# Patient Record
Sex: FEMALE | Race: WHITE | URBAN - NONMETROPOLITAN AREA
[De-identification: names, ages, dates, MRNs, and addresses within clinical notes are randomized per-mention and may not be internally consistent; named-entity substitution may affect disease eponyms.]

---

## 2021-10-15 ENCOUNTER — OFFICE VISIT (OUTPATIENT)
Dept: NEPHROLOGY | Age: 61
End: 2021-10-15
Payer: MEDICARE

## 2021-10-15 VITALS
OXYGEN SATURATION: 97 % | SYSTOLIC BLOOD PRESSURE: 148 MMHG | TEMPERATURE: 97.3 F | DIASTOLIC BLOOD PRESSURE: 94 MMHG | HEART RATE: 71 BPM | WEIGHT: 151 LBS

## 2021-10-15 DIAGNOSIS — I12.9 HYPERTENSIVE RENAL DISEASE, STAGE 1 THROUGH STAGE 4 OR UNSPECIFIED CHRONIC KIDNEY DISEASE: ICD-10-CM

## 2021-10-15 DIAGNOSIS — N18.4 CKD (CHRONIC KIDNEY DISEASE), STAGE IV (HCC): Primary | ICD-10-CM

## 2021-10-15 PROCEDURE — 99204 OFFICE O/P NEW MOD 45 MIN: CPT | Performed by: INTERNAL MEDICINE

## 2021-10-15 PROCEDURE — 1036F TOBACCO NON-USER: CPT | Performed by: INTERNAL MEDICINE

## 2021-10-15 PROCEDURE — G8484 FLU IMMUNIZE NO ADMIN: HCPCS | Performed by: INTERNAL MEDICINE

## 2021-10-15 PROCEDURE — G8427 DOCREV CUR MEDS BY ELIG CLIN: HCPCS | Performed by: INTERNAL MEDICINE

## 2021-10-15 PROCEDURE — G8421 BMI NOT CALCULATED: HCPCS | Performed by: INTERNAL MEDICINE

## 2021-10-15 PROCEDURE — 3017F COLORECTAL CA SCREEN DOC REV: CPT | Performed by: INTERNAL MEDICINE

## 2021-10-15 RX ORDER — AMLODIPINE BESYLATE 5 MG/1
5 TABLET ORAL DAILY
Qty: 30 TABLET | Refills: 3 | Status: SHIPPED | OUTPATIENT
Start: 2021-10-15 | End: 2022-04-27 | Stop reason: SDUPTHER

## 2021-10-15 RX ORDER — LABETALOL 200 MG/1
200 TABLET, FILM COATED ORAL 2 TIMES DAILY
Qty: 60 TABLET | Refills: 3 | Status: SHIPPED | OUTPATIENT
Start: 2021-10-15 | End: 2022-04-27 | Stop reason: SDUPTHER

## 2021-10-15 RX ORDER — LABETALOL 100 MG/1
100 TABLET, FILM COATED ORAL 2 TIMES DAILY
COMMUNITY
Start: 2021-10-12 | End: 2021-10-15 | Stop reason: DRUGHIGH

## 2021-10-15 NOTE — PROGRESS NOTES
[address] [facility] [address]  Dept: [phone]  Loc: [phone]  Outpatient Consult Note  10/15/2021 11:28 AM        Pt Name:    Carole Hernández  YOB: 1960  Primary Care Physician:  vAa Card     Chief Complaint:   Chief Complaint   Patient presents with   Jael Stephens Patient     Dr Juice Sanders: for renal dysfunction        Background Information/HPI   The patient is a 63 yo female from [place] with no sig [address] who is here for evaluation of elevated creatinine. She is here with daughter-in-law. No hx DM. Has hx HTN but only started getting treated. Did not take medicines for HTN for long time. Daughter-in-law says she would never go to the doctor. She moved from [place] in 2018. She used to work as  in [place]. No hx smoking. No hx alcohol use. No hx CVA. No known heart problems. No hx kidney stones. No leg swelling. No chest pain or shortness of breath. No fever or chills. No hematuria. She just starting seeing a PCP. She has a BP monitor at home. She brought a log of BP readings - mostly running in 140/90 range now. Used to run higher. Daughter in law says she was on BP medications in [place] but then for 3 years she did not take her medicines. She was taking some tylenol. She also took some motrin in the past. No hx cancer or lung problems. Sister has kidney problems in [place]. Allergies:  Patient has no known allergies.         Past Medical History:  Past Medical History:   Diagnosis Date    Hypertension         Past Surgical History:  Past Surgical History:   Procedure Laterality Date     SECTION          Family History:  Family History   Problem Relation Age of Onset    Diabetes Father     Hypertension Father         Social History:  Social History     Socioeconomic History    Marital status: Unknown     Spouse name: Not on file    Number of children: Not on file    Years of education: Not on file    Highest education level: Not on file   Occupational History    Not on file   Tobacco Use    Smoking status: Never Smoker    Smokeless tobacco: Never Used   Vaping Use    Vaping Use: Never used   Substance and Sexual Activity    Alcohol use: Not on file    Drug use: Not on file    Sexual activity: Not on file   Other Topics Concern    Not on file   Social History Narrative    Not on file     Social Determinants of Health     Financial Resource Strain:     Difficulty of Paying Living Expenses:    Food Insecurity:     Worried About Running Out of Food in the Last Year:     [address] in the Last Year:    Transportation Needs:     Lack of Transportation (Medical):      Lack of Transportation (Non-Medical):    Physical Activity:     Days of Exercise per Week:     Minutes of Exercise per Session:    Stress:     Feeling of Stress :    Social Connections:     Frequency of Communication with Friends and Family:     Frequency of Social Gatherings with Friends and Family:     Attends [employer] Services:     Active Member of Clubs or Organizations:     Attends Club or Organization Meetings:     Marital Status:    Intimate Partner Violence:     Fear of Current or Ex-Partner:     Emotionally Abused:     Physically Abused:     Sexually Abused:         Review of Systems:  Constitutional: no fever or chills  Head: No headaches  Eyes: no blurry vision, no discharge  Ears: no ear pain or hearing changes  Nose: no runny nose or epistaxis  Respiratory: no shortness of breath or cough or sputum production  Cardiovascular: no chest pain  GI: no nausea, no vomiting or diarrhea  : denies any discharge  Skin: no rash  Musculoskeletal: no joint pain, moves all ext  Neuro: no numbness or tingling, no slurred speech  Psychiatric: stable mood, no depression or insomnia    All other review of systems were reviewed and negative Home Medications:  Prior to Admission medications    Medication Sig Start Date End Date Taking? Authorizing Provider   labetalol (NORMODYNE) 100 MG tablet Take 100 mg by mouth 2 times daily 10/12/21 11/11/21 Yes Historical Provider, MD        Physical Examination:  VITALS:  BP (!) 148/94 (Site: Right Upper Arm, Position: Sitting, Cuff Size: Medium Adult)   Pulse 71   Temp 97.3 °F (36.3 °C)   Wt 151 lb (68.5 kg)   SpO2 97%   There is no height or weight on file to calculate BMI. Wt Readings from Last 3 Encounters:   10/15/21 151 lb (68.5 kg)     Constitutional and General Appearance: alert and cooperative with exam, appears comfortable, no distress, not diaphoretic  Eyes: no icteric sclera in left eye or right eye  Ears and Nose: normal external appearance of left and right ear  Oral: moist oral mucus membranes  Neck: No jugular venous distention  Lungs: Air entry B/L, no crackles or rales  Chest: No chest wall tenderness  Heart: regular rate, S1, S2  Extremities: No pitting LE edema, no tenderness  GI: soft, non-tender  Skin: no rash seen on exposed extremities, warm to touch  Musculo: moves all extremities  Neuro: no slurred speech  Psychiatric: Normal mood and affect, Not agitated     Laboratory & Diagnostics:  Old progress notes from referring physician reviewed. Radiology/US kidneys:   Echo:   Old labs reviewed:  Jan 2019: Creat 1.3  July 2021: Creat 2.0  Sept 2021: Creat 2.1, K 5.1     Impression/Plan:   1. CKD IV: progressive in nature: likely due to chronically uncontrolled HTN. Likely has HTN nephrosclerosis. Will send work-up including UA urine protein-creat ration and kidney US to evaluate kidney size and echogenicity. Avoid Motrin and other types of NSAIDs. Drink more water. Advised low salt diet. Discussed need for better blood pressure control  2. HTN: Sub-optimally controlled. On labetalol 100 mg BID. Increase labetalol 200 mg BID and start norvasc 5 mg po daily. Advised her low-salt diet. Advised her to reduced red meat intake. Bring blood pressure readings. Call next week with some readings. 3. Recent COVID 23    Discussed with daughter in law. She speaks fairly good english. All questions were answered. Check labs in 2 weeks    Orders Placed This Encounter   Procedures    US RENAL COMPLETE    Basic Metabolic Panel    CBC    PTH, Intact    Phosphorus    Vitamin D 25 Hydroxy    Urinalysis    Protein / creatinine ratio, urine     Return in about 2 months (around 12/15/2021).     Thought process was discussed with the patient  Thank you for the referral  Please do not hesitate to contact me if you have any questions or concerns  I will make further recommendations depending on clinical course and lab/diagnostic results      Zackary Iqbal MD  [facility]

## 2021-10-29 LAB
BASOPHILS ABSOLUTE: ABNORMAL
BASOPHILS RELATIVE PERCENT: ABNORMAL
BILIRUBIN, URINE: NEGATIVE
BLOOD, URINE: POSITIVE
BUN BLDV-MCNC: 30 MG/DL
CALCIUM SERPL-MCNC: 9.6 MG/DL
CHLORIDE BLD-SCNC: 106 MMOL/L
CLARITY: CLEAR
CO2: 27 MMOL/L
COLOR: YELLOW
CREAT SERPL-MCNC: 2.05 MG/DL
EOSINOPHILS ABSOLUTE: ABNORMAL
EOSINOPHILS RELATIVE PERCENT: ABNORMAL
GFR CALCULATED: 25
GLUCOSE BLD-MCNC: 87 MG/DL
GLUCOSE URINE: NEGATIVE
HCT VFR BLD CALC: 34.6 % (ref 36–46)
HEMOGLOBIN: 12.1 G/DL (ref 12–16)
KETONES, URINE: NEGATIVE
LEUKOCYTE ESTERASE, URINE: NEGATIVE
LYMPHOCYTES ABSOLUTE: ABNORMAL
LYMPHOCYTES RELATIVE PERCENT: ABNORMAL
MCH RBC QN AUTO: ABNORMAL PG
MCHC RBC AUTO-ENTMCNC: ABNORMAL G/DL
MCV RBC AUTO: ABNORMAL FL
MONOCYTES ABSOLUTE: ABNORMAL
MONOCYTES RELATIVE PERCENT: ABNORMAL
NEUTROPHILS ABSOLUTE: ABNORMAL
NEUTROPHILS RELATIVE PERCENT: ABNORMAL
NITRITE, URINE: NEGATIVE
PH UA: 5 (ref 4.5–8)
PHOSPHORUS: 4.9 MG/DL
PLATELET # BLD: 201 K/ΜL
PMV BLD AUTO: ABNORMAL FL
POTASSIUM SERPL-SCNC: 4.3 MMOL/L
PROTEIN UA: POSITIVE
RBC # BLD: 3.94 10^6/ΜL
SODIUM BLD-SCNC: 142 MMOL/L
SPECIFIC GRAVITY, URINE: 1.01
UROBILINOGEN, URINE: NORMAL
VITAMIN D 25-HYDROXY: 28.4
VITAMIN D2, 25 HYDROXY: NORMAL
VITAMIN D3,25 HYDROXY: NORMAL
WBC # BLD: 5.7 10^3/ML

## 2021-12-06 DIAGNOSIS — N18.4 CKD (CHRONIC KIDNEY DISEASE), STAGE IV (HCC): ICD-10-CM

## 2021-12-15 ENCOUNTER — OFFICE VISIT (OUTPATIENT)
Dept: NEPHROLOGY | Age: 61
End: 2021-12-15
Payer: MEDICARE

## 2021-12-15 VITALS
SYSTOLIC BLOOD PRESSURE: 125 MMHG | WEIGHT: 157 LBS | TEMPERATURE: 97.2 F | HEART RATE: 73 BPM | DIASTOLIC BLOOD PRESSURE: 75 MMHG | OXYGEN SATURATION: 98 %

## 2021-12-15 DIAGNOSIS — E55.9 VITAMIN D DEFICIENCY: ICD-10-CM

## 2021-12-15 DIAGNOSIS — R80.9 PROTEINURIA, UNSPECIFIED TYPE: ICD-10-CM

## 2021-12-15 DIAGNOSIS — N18.4 CKD (CHRONIC KIDNEY DISEASE), STAGE IV (HCC): Primary | ICD-10-CM

## 2021-12-15 DIAGNOSIS — I12.9 HYPERTENSIVE RENAL DISEASE, STAGE 1 THROUGH STAGE 4 OR UNSPECIFIED CHRONIC KIDNEY DISEASE: ICD-10-CM

## 2021-12-15 PROCEDURE — 1036F TOBACCO NON-USER: CPT | Performed by: INTERNAL MEDICINE

## 2021-12-15 PROCEDURE — G8484 FLU IMMUNIZE NO ADMIN: HCPCS | Performed by: INTERNAL MEDICINE

## 2021-12-15 PROCEDURE — 99214 OFFICE O/P EST MOD 30 MIN: CPT | Performed by: INTERNAL MEDICINE

## 2021-12-15 PROCEDURE — 3017F COLORECTAL CA SCREEN DOC REV: CPT | Performed by: INTERNAL MEDICINE

## 2021-12-15 PROCEDURE — G8427 DOCREV CUR MEDS BY ELIG CLIN: HCPCS | Performed by: INTERNAL MEDICINE

## 2021-12-15 PROCEDURE — G8421 BMI NOT CALCULATED: HCPCS | Performed by: INTERNAL MEDICINE

## 2021-12-15 RX ORDER — LOSARTAN POTASSIUM 50 MG/1
50 TABLET ORAL DAILY
Qty: 90 TABLET | Refills: 3 | Status: SHIPPED | OUTPATIENT
Start: 2021-12-15 | End: 2022-04-27

## 2021-12-15 NOTE — PROGRESS NOTES
[address] [facility] [address]  Dept: [phone]  Loc: [phone]  Office Progress Note  12/15/2021 2:23 PM      Pt Name:    Gisela Adamson  YOB: 1960  Primary Care Physician:  Daniel Sosa     Chief Complaint:   Chief Complaint   Patient presents with    Chronic Kidney Disease     Stage 4        Background Information/Interval History:   The patient is a 65 yo female from [place] with no sig [address] who is here for follow-up evaluation of elevated creatinine. She is here with daughter-in-law. Did not take medicines for HTN for long time. Daughter-in-law says she would never go to the doctor. She moved from [place] in 2018. She used to work as  in [place]. Daughter in law says she was on BP medications in [place] but then for 3 years she did not take her medicines. She also took some motrin in the past.    She is here for follow-up. Here with her daughter-in-law. She has no chest pain or shortness of breath. No urinary complaints. No dysuria. No hematuria. No leg swelling. Brought a log of BP readings and most readings are in 120-130 range. Past History:  Past Medical History:   Diagnosis Date    Hypertension      Past Surgical History:   Procedure Laterality Date     SECTION          VITALS:  /75 (Site: Left Upper Arm, Position: Sitting, Cuff Size: Medium Adult)   Pulse 73   Temp 97.2 °F (36.2 °C)   Wt 157 lb (71.2 kg)   SpO2 98%   Wt Readings from Last 3 Encounters:   12/15/21 157 lb (71.2 kg)   10/15/21 151 lb (68.5 kg)     There is no height or weight on file to calculate BMI.      General Appearance: alert and cooperative with exam, appears comfortable, no distress  Oral: moist oral mucus membranes  Neck: No jugular venous distention  Lungs: Air entry B/L, no crackles or rales, no use of accessory muscles  Heart: S1, S2 heard  GI: soft, non-tender, no guarding  Extremities: No sig LE edema     Medications:  Current Outpatient Medications   Medication Sig Dispense Refill    labetalol (NORMODYNE) 200 MG tablet Take 1 tablet by mouth 2 times daily 60 tablet 3    amLODIPine (NORVASC) 5 MG tablet Take 1 tablet by mouth daily 30 tablet 3     No current facility-administered medications for this visit. Laboratory & Diagnostics:  Old progress notes from referring physician reviewed. Radiology/US kidneys: R 8.7, L 7.6 cm kidneys  Echo:   Old labs reviewed:  Jan 2019: Creat 1.3  July 2021: Creat 2.0  Sept 2021: Creat 2.1, K 5.1  Oct 2021: creat 2.05, K 4.3, UA: 1+ protein, trace blood, Phos 4.9, UPCR 1.3 g/g, Vit D 28, PTH 76     Impression/Plan:   1. CKD IV: progressive in nature: likely due to chronically uncontrolled HTN. Likely has HTN nephrosclerosis. Stable renal funtion but needs good chronic bp control to minimize progression of CKD. UA is otherwise mostly benign but she does have some proteinuria. Advised low salt diet and advised less red meat intake. US noted. Has some B/L small kidneys. She is not certain if she had UTIs in childhood. 2. Proteinuria: needs to monitor. Advised to avoid NSAIDs. Discussed in detail with daughter. Advised low red meat intake. Advised low salt diet. Add cozaar 50 mg po daily. Repeat BMP in 1 month. Repeat urine protein creatinine ratio in 3 months. If persistent then may consider sending some serological evaluation. However she needs better blood pressure control for now  3. HTN: BP starting to improve but still has some room for improvement. She is on labetalol and norvasc. Add Cozaar 50 mg po daily. Recheck labs in 4 weeks. 4. Recent COVID 19  5. Vitamin D deficiency. Will prescribe vitamin D.  Prescription sent    Discussed with patient and daughter in detail. Had long discussion with patient. Patient very concerned about dialysis.   At this time clearly indicated to patient and daughter-in-law that there is no indication for dialysis at the present moment. However strongly emphasized need for better blood pressure control in the long run. Also emphasized need for low-salt diet. Avoid NSAIDs like Aleve, Advil, naproxen, ibuprofen, Motrin etc.    Orders Placed This Encounter   Procedures    Basic Metabolic Panel    CBC    Protein / creatinine ratio, urine    Protein / creatinine ratio, urine    Vitamin D 25 Hydroxy    PTH, Intact    Phosphorus    Basic Metabolic Panel     Return in about 4 months (around 4/15/2022).     Romy Hobson MD  [facility]

## 2022-01-25 ENCOUNTER — TELEPHONE (OUTPATIENT)
Dept: NEPHROLOGY | Age: 62
End: 2022-01-25

## 2022-04-27 ENCOUNTER — OFFICE VISIT (OUTPATIENT)
Dept: NEPHROLOGY | Age: 62
End: 2022-04-27
Payer: COMMERCIAL

## 2022-04-27 VITALS
OXYGEN SATURATION: 94 % | SYSTOLIC BLOOD PRESSURE: 137 MMHG | WEIGHT: 154 LBS | HEART RATE: 79 BPM | TEMPERATURE: 98.2 F | DIASTOLIC BLOOD PRESSURE: 91 MMHG

## 2022-04-27 DIAGNOSIS — N18.4 CKD (CHRONIC KIDNEY DISEASE), STAGE IV (HCC): Primary | ICD-10-CM

## 2022-04-27 DIAGNOSIS — I12.9 HYPERTENSIVE RENAL DISEASE, STAGE 1 THROUGH STAGE 4 OR UNSPECIFIED CHRONIC KIDNEY DISEASE: ICD-10-CM

## 2022-04-27 DIAGNOSIS — R80.9 PROTEINURIA, UNSPECIFIED TYPE: ICD-10-CM

## 2022-04-27 PROBLEM — I10 ESSENTIAL HYPERTENSION: Status: ACTIVE | Noted: 2022-04-22

## 2022-04-27 PROCEDURE — 99213 OFFICE O/P EST LOW 20 MIN: CPT | Performed by: INTERNAL MEDICINE

## 2022-04-27 PROCEDURE — 3017F COLORECTAL CA SCREEN DOC REV: CPT | Performed by: INTERNAL MEDICINE

## 2022-04-27 PROCEDURE — G8427 DOCREV CUR MEDS BY ELIG CLIN: HCPCS | Performed by: INTERNAL MEDICINE

## 2022-04-27 PROCEDURE — G8421 BMI NOT CALCULATED: HCPCS | Performed by: INTERNAL MEDICINE

## 2022-04-27 PROCEDURE — 1036F TOBACCO NON-USER: CPT | Performed by: INTERNAL MEDICINE

## 2022-04-27 RX ORDER — LABETALOL 200 MG/1
200 TABLET, FILM COATED ORAL 2 TIMES DAILY
Qty: 180 TABLET | Refills: 3 | Status: SHIPPED | OUTPATIENT
Start: 2022-04-27

## 2022-04-27 RX ORDER — LOSARTAN POTASSIUM 100 MG/1
100 TABLET ORAL DAILY
Qty: 90 TABLET | Refills: 1 | Status: SHIPPED | OUTPATIENT
Start: 2022-04-27

## 2022-04-27 RX ORDER — AMLODIPINE BESYLATE 10 MG/1
10 TABLET ORAL DAILY
Qty: 90 TABLET | Refills: 3 | Status: SHIPPED | OUTPATIENT
Start: 2022-04-27

## 2022-04-27 NOTE — PROGRESS NOTES
[address] [facility] [address]  Dept: [phone]  Loc: [phone]  Office Progress Note  2022 1:23 PM      Pt Name:    Valentino Ser  YOB: 1960  Primary Care Physician:  Bryan Sparrow     Chief Complaint:   Chief Complaint   Patient presents with    Chronic Kidney Disease     Stage 4        Background Information/Interval History:   The patient is a 65 yo female from [place] with no sig [address] who is here for follow-up evaluation of elevated creatinine. Did not take medicines for HTN for long time. She moved from [place] in 2018. She used to work as  in [place]. Family previously reported that patient was not taking her anti-hypertensive medications for many years while she was still residing at [facility]. She also took some motrin in the past.    She is here for follow-up. Here with daughter, Boom Delgado. BP is much better now. She feels better. No chest pain or shortness of breath. No nausea. No urinary complaints. Family reports that blood pressure has improved     Past History:  Past Medical History:   Diagnosis Date    Hypertension      Past Surgical History:   Procedure Laterality Date     SECTION          VITALS:  BP (!) 137/91 (Site: Right Upper Arm, Position: Sitting, Cuff Size: Medium Adult)   Pulse 79   Temp 98.2 °F (36.8 °C)   Wt 154 lb (69.9 kg)   SpO2 94%   Wt Readings from Last 3 Encounters:   22 154 lb (69.9 kg)   12/15/21 157 lb (71.2 kg)   10/15/21 151 lb (68.5 kg)     There is no height or weight on file to calculate BMI.      General Appearance: alert and cooperative with exam, appears comfortable, no distress  Oral: moist oral mucus membranes  Neck: No jugular venous distention  Lungs: Air entry B/L, no crackles or rales, no use of accessory muscles  Heart: S1, S2 heard  GI: soft, non-tender, no guarding  Extremities: No sig LE edema     Medications:  Current Outpatient Medications   Medication Sig Dispense Refill    losartan (COZAAR) 50 MG tablet Take 1 tablet by mouth daily 90 tablet 3    vitamin D (CHOLECALCIFEROL) 25 MCG (1000 UT) TABS tablet Take 1 tablet by mouth daily 90 tablet 3    labetalol (NORMODYNE) 200 MG tablet Take 1 tablet by mouth 2 times daily 60 tablet 3    amLODIPine (NORVASC) 5 MG tablet Take 1 tablet by mouth daily 30 tablet 3     No current facility-administered medications for this visit. Laboratory & Diagnostics:  Old progress notes from referring physician reviewed. Radiology/US kidneys: R 8.7, L 7.6 cm kidneys  Echo:   Old labs reviewed:  Jan 2019: Creat 1.3  July 2021: Creat 2.0  Sept 2021: Creat 2.1, K 5.1  Oct 2021: creat 2.05, K 4.3, UA: 1+ protein, trace blood, Phos 4.9, UPCR 1.3 g/g, Vit D 28, PTH 76  March 2022: UPCR 1.7 grams/g, Vit D 24, Hb 12.7, Phos 5.0, potassium 5.0, creatinine 2.28     Impression/Plan:   1. CKD IV: progressive in nature: likely due to chronically uncontrolled HTN. Likely has HTN nephrosclerosis. Stable renal funtion but needs good chronic bp control to minimize progression of CKD. UA is otherwise mostly benign but she does have some proteinuria. Advised low salt diet and advised less red meat intake. US noted. Has some B/L small kidneys. She is not certain if she had UTIs in childhood. 2. Proteinuria: has small kidney, ??post adaptive, has nephrosclerosis. Likely contributing to proteinuria. But will send basic serologies including NAYAN, Complements and SPEP and VASYL. Continue with losartan. Increase losartan 100 mg daily. Recheck labs  3. HTN: on labetalol, increase norvasc 10 mg daily and losartan 100 mg daily. Advised to call me with some blood pressure readings next week  4. Recent COVID 19  5. Vitamin D deficiency: on replacement. Refilled    Medications refilled, Discussed with daughter.      Orders Placed This Encounter   Procedures    Basic Metabolic Panel  Protein / creatinine ratio, urine    Electrophoresis Protein, Serum    Immunofixation electrophoresis    NAYAN Screen with Reflex    C4 Complement    C3 Complement     Return in about 4 months (around 8/27/2022).       Go Lopez MD  [facility]

## 2022-08-31 ENCOUNTER — OFFICE VISIT (OUTPATIENT)
Dept: NEPHROLOGY | Age: 62
End: 2022-08-31
Payer: COMMERCIAL

## 2022-08-31 VITALS
WEIGHT: 155 LBS | TEMPERATURE: 98 F | OXYGEN SATURATION: 97 % | DIASTOLIC BLOOD PRESSURE: 81 MMHG | SYSTOLIC BLOOD PRESSURE: 119 MMHG | HEART RATE: 84 BPM

## 2022-08-31 DIAGNOSIS — I12.9 HYPERTENSIVE RENAL DISEASE, STAGE 1 THROUGH STAGE 4 OR UNSPECIFIED CHRONIC KIDNEY DISEASE: ICD-10-CM

## 2022-08-31 DIAGNOSIS — N18.4 CKD (CHRONIC KIDNEY DISEASE), STAGE IV (HCC): Primary | ICD-10-CM

## 2022-08-31 PROCEDURE — G8427 DOCREV CUR MEDS BY ELIG CLIN: HCPCS | Performed by: INTERNAL MEDICINE

## 2022-08-31 PROCEDURE — 99213 OFFICE O/P EST LOW 20 MIN: CPT | Performed by: INTERNAL MEDICINE

## 2022-08-31 PROCEDURE — G8421 BMI NOT CALCULATED: HCPCS | Performed by: INTERNAL MEDICINE

## 2022-08-31 PROCEDURE — 1036F TOBACCO NON-USER: CPT | Performed by: INTERNAL MEDICINE

## 2022-08-31 PROCEDURE — 3017F COLORECTAL CA SCREEN DOC REV: CPT | Performed by: INTERNAL MEDICINE

## 2022-08-31 NOTE — PROGRESS NOTES
[address] [facility] [address]  Dept: [phone]  Loc: [phone]  Office Progress Note  2022 2:18 PM      Pt Name:    Marito Becker  YOB: 1960  Primary Care Physician:  Tanmay Grimaldo     Chief Complaint:   Chief Complaint   Patient presents with    Follow-up     4 months for CKD IV and HTN        Background Information/Interval History:   The patient is a 57 yo female from [place] with no sig [address] who is here for follow-up evaluation of elevated creatinine. Did not take medicines for HTN for long time. She moved from [place] in 2018. She used to work as  in [place]. Family previously reported that patient was not taking her anti-hypertensive medications for many years while she was still residing at [facility]. She also took some motrin in the past.    Pt is here for follow-up. She is here with her daughter. BP at home is usually in 120s per daughter. Today in office it is 119/81. No leg swelling. No chest pain or shortness of breath. She is on oral anti HTN medications. Patient did not get any labs done for this visit. Past History:  Past Medical History:   Diagnosis Date    Hypertension      Past Surgical History:   Procedure Laterality Date     SECTION          VITALS:  /81 (Site: Right Upper Arm, Position: Sitting, Cuff Size: Medium Adult)   Pulse 84   Temp 98 °F (36.7 °C)   Wt 155 lb (70.3 kg)   SpO2 97%   Wt Readings from Last 3 Encounters:   22 155 lb (70.3 kg)   22 154 lb (69.9 kg)   12/15/21 157 lb (71.2 kg)     There is no height or weight on file to calculate BMI.      General Appearance: alert and cooperative with exam, appears comfortable, no distress  Oral: moist oral mucus membranes  Neck: No jugular venous distention  Lungs: Air entry B/L, no crackles or rales, no use of accessory muscles  Heart: S1, S2 heard  GI: soft, non-tender, no guarding  Extremities: No sig LE edema     Medications:  Current Outpatient Medications   Medication Sig Dispense Refill    labetalol (NORMODYNE) 200 MG tablet Take 1 tablet by mouth 2 times daily 180 tablet 3    amLODIPine (NORVASC) 10 MG tablet Take 1 tablet by mouth daily 90 tablet 3    losartan (COZAAR) 100 MG tablet Take 1 tablet by mouth daily 90 tablet 1    vitamin D (CHOLECALCIFEROL) 25 MCG (1000 UT) TABS tablet Take 1 tablet by mouth daily (Patient not taking: Reported on 8/31/2022) 90 tablet 3     No current facility-administered medications for this visit. Laboratory & Diagnostics:  Old progress notes from referring physician reviewed. Radiology/US kidneys: R 8.7, L 7.6 cm kidneys  Echo:   Old labs reviewed:  Jan 2019: Creat 1.3  July 2021: Creat 2.0  Sept 2021: Creat 2.1, K 5.1  Oct 2021: creat 2.05, K 4.3, UA: 1+ protein, trace blood, Phos 4.9, UPCR 1.3 g/g, Vit D 28, PTH 76  March 2022: UPCR 1.7 grams/g, Vit D 24, Hb 12.7, Phos 5.0, potassium 5.0, creatinine 2.28    March 2022: Vit D 24     Impression/Plan:   1. CKD IV: progressive in nature: likely due to chronically uncontrolled HTN. Likely has HTN nephrosclerosis. UA is mostly benign but she does have some proteinuria. Has some B/L small kidneys. She is not certain if she had UTIs in childhood. Pt did nto get any labs done this visit. Will give another set of orders. Discussed with daughter in detail. Need labs ASAP  2. Proteinuria: has small kidney, ??post adaptive, has nephrosclerosis. Likely contributing to proteinuria. But Ordered serologies last time but patient did not get any labs done. Needs basic serologies including NAYAN, Complements and SPEP and VASYL. D/W patient and she will get labs done this week  3. HTN: on labetalol, norvasc,  and losartan 100 mg daily. BP is stable.    4.  Vitamin D deficiency: on replacement    Orders Placed This Encounter   Procedures    CBC    Basic Metabolic Panel    Vitamin D 25 Hydroxy    Protein / creatinine ratio, urine    Urinalysis    Electrophoresis Protein, Serum    Immunofixation electrophoresis    NAYAN Screen with Reflex    C4 Complement    C3 Complement    PTH, Intact    Phosphorus     Return in about 4 months (around 12/31/2022).       Arlen Torres MD  [facility]

## 2022-09-02 NOTE — TELEPHONE ENCOUNTER
----- Message from Edie Fam MD sent at 9/1/2022  9:22 PM EDT -----  Start Ergocalciferol 50,000 units po weekly, Disp #4, Refills # 3      ----- Message -----  From: Jamir Dey  Sent: 9/1/2022  10:16 AM EDT  To: Edie Fam MD

## 2022-09-12 RX ORDER — ERGOCALCIFEROL (VITAMIN D2) 1250 MCG
50000 CAPSULE ORAL WEEKLY
COMMUNITY
End: 2022-09-12 | Stop reason: SDUPTHER

## 2022-09-12 RX ORDER — ERGOCALCIFEROL (VITAMIN D2) 1250 MCG
50000 CAPSULE ORAL WEEKLY
Qty: 4 CAPSULE | Refills: 3 | Status: SHIPPED | OUTPATIENT
Start: 2022-09-12

## 2022-09-12 NOTE — TELEPHONE ENCOUNTER
Spoke to Luis, pt's . He understands that pt is to start taking Ergocalciferol weekly.      Script pending

## 2022-09-19 RX ORDER — ERGOCALCIFEROL 1.25 MG/1
50000 CAPSULE ORAL WEEKLY
Qty: 4 CAPSULE | Refills: 3 | OUTPATIENT
Start: 2022-09-19

## 2023-01-04 ENCOUNTER — OFFICE VISIT (OUTPATIENT)
Dept: NEPHROLOGY | Age: 63
End: 2023-01-04
Payer: COMMERCIAL

## 2023-01-04 VITALS
TEMPERATURE: 98.4 F | WEIGHT: 157 LBS | DIASTOLIC BLOOD PRESSURE: 82 MMHG | OXYGEN SATURATION: 97 % | SYSTOLIC BLOOD PRESSURE: 132 MMHG | HEART RATE: 70 BPM

## 2023-01-04 DIAGNOSIS — R80.9 PROTEINURIA, UNSPECIFIED TYPE: ICD-10-CM

## 2023-01-04 DIAGNOSIS — I12.9 HYPERTENSIVE RENAL DISEASE, STAGE 1 THROUGH STAGE 4 OR UNSPECIFIED CHRONIC KIDNEY DISEASE: ICD-10-CM

## 2023-01-04 DIAGNOSIS — N18.4 CKD (CHRONIC KIDNEY DISEASE), STAGE IV (HCC): Primary | ICD-10-CM

## 2023-01-04 PROCEDURE — 1036F TOBACCO NON-USER: CPT | Performed by: INTERNAL MEDICINE

## 2023-01-04 PROCEDURE — 99213 OFFICE O/P EST LOW 20 MIN: CPT | Performed by: INTERNAL MEDICINE

## 2023-01-04 PROCEDURE — 3017F COLORECTAL CA SCREEN DOC REV: CPT | Performed by: INTERNAL MEDICINE

## 2023-01-04 PROCEDURE — 3079F DIAST BP 80-89 MM HG: CPT | Performed by: INTERNAL MEDICINE

## 2023-01-04 PROCEDURE — G8427 DOCREV CUR MEDS BY ELIG CLIN: HCPCS | Performed by: INTERNAL MEDICINE

## 2023-01-04 PROCEDURE — G8484 FLU IMMUNIZE NO ADMIN: HCPCS | Performed by: INTERNAL MEDICINE

## 2023-01-04 PROCEDURE — 3075F SYST BP GE 130 - 139MM HG: CPT | Performed by: INTERNAL MEDICINE

## 2023-01-04 PROCEDURE — G8421 BMI NOT CALCULATED: HCPCS | Performed by: INTERNAL MEDICINE

## 2023-01-04 RX ORDER — LOSARTAN POTASSIUM 100 MG/1
100 TABLET ORAL DAILY
Qty: 90 TABLET | Refills: 3 | Status: SHIPPED | OUTPATIENT
Start: 2023-01-04

## 2023-01-04 RX ORDER — LABETALOL 200 MG/1
200 TABLET, FILM COATED ORAL 2 TIMES DAILY
Qty: 180 TABLET | Refills: 3 | Status: SHIPPED | OUTPATIENT
Start: 2023-01-04

## 2023-01-04 RX ORDER — AMLODIPINE BESYLATE 10 MG/1
10 TABLET ORAL DAILY
Qty: 90 TABLET | Refills: 3 | Status: SHIPPED | OUTPATIENT
Start: 2023-01-04

## 2023-01-04 NOTE — PROGRESS NOTES
[address] [facility] [address]  Dept: [phone]  Loc: [phone]  Office Progress Note  2023 1:41 PM      Pt Name:    Karin Blunt  YOB: 1960  Primary Care Physician:  Katie Schwartz     Chief Complaint:   Chief Complaint   Patient presents with    Follow-up     4 months for CKD IV and HTN        Background Information/Interval History:   The patient is a 57 yo female from [place] with no sig [address] who is here for follow-up evaluation of elevated creatinine. Did not take medicines for HTN for long time. She moved from [place] in 2018. She used to work as  in [place]. Family previously reported that patient was not taking her anti-hypertensive medications for many years while she was still residing at [facility]. She also took some motrin in the past.    Here for 4 months follow-up. Here with her daughter. Did not get labs drawn. Daughter, Mirtha Richard, reports she is doing okay. No chest pain or shortness of breath. No urinary complaints. BP is better controlled based on several home readings. Patient brought a log. Past History:  Past Medical History:   Diagnosis Date    Hypertension      Past Surgical History:   Procedure Laterality Date     SECTION          VITALS:  /82 (Site: Right Upper Arm, Position: Sitting, Cuff Size: Medium Adult)   Pulse 70   Temp 98.4 °F (36.9 °C)   Wt 157 lb (71.2 kg)   SpO2 97%   Wt Readings from Last 3 Encounters:   23 157 lb (71.2 kg)   22 155 lb (70.3 kg)   22 154 lb (69.9 kg)     There is no height or weight on file to calculate BMI.      General Appearance: alert and cooperative with exam, appears comfortable, no distress  Oral: moist oral mucus membranes  Neck: No jugular venous distention  Lungs: Air entry B/L, no crackles or rales, no use of accessory muscles  Heart: S1, S2 heard  GI: soft, non-tender, no guarding  Extremities: No sig LE edema     Medications:  Current Outpatient Medications   Medication Sig Dispense Refill    ergocalciferol (ERGOCALCIFEROL) 1.25 MG (68742 UT) capsule Take 1 capsule by mouth once a week 4 capsule 3    labetalol (NORMODYNE) 200 MG tablet Take 1 tablet by mouth 2 times daily 180 tablet 3    amLODIPine (NORVASC) 10 MG tablet Take 1 tablet by mouth daily 90 tablet 3    losartan (COZAAR) 100 MG tablet Take 1 tablet by mouth daily 90 tablet 1    vitamin D (CHOLECALCIFEROL) 25 MCG (1000 UT) TABS tablet Take 1 tablet by mouth daily (Patient not taking: No sig reported) 90 tablet 3     No current facility-administered medications for this visit. Laboratory & Diagnostics:  Old progress notes from referring physician reviewed. Radiology/US kidneys: R 8.7, L 7.6 cm kidneys  Echo:   Old labs reviewed:  Jan 2019: Creat 1.3  July 2021: Creat 2.0  Sept 2021: Creat 2.1, K 5.1  Oct 2021: creat 2.05, K 4.3, UA: 1+ protein, trace blood, Phos 4.9, UPCR 1.3 g/g, Vit D 28, PTH 76  March 2022: UPCR 1.7 grams/g, Vit D 24, Hb 12.7, Phos 5.0, potassium 5.0, creatinine 2.28    March 2022: Vit D 24  Aug 2022: UA: 2+ protein, 1+ blood, 3-5 RBC, UPCR 1.8 g/g, Hb 12.7  Phos 4.1, Vit D 26, K 4.3, Creat 2.2     Impression/Plan:   1. CKD IV: progressive in nature: likely due to chronically uncontrolled HTN. Likely has HTN nephrosclerosis. UA is mostly benign but she does have some proteinuria. Has some B/L small kidneys. She is not certain if she had UTIs in childhood. Difficult to manage at times. ..she did not get any labs done for this visit. Advised patient to get labs done ASAP and she agreed. 2. Proteinuria: has small kidney, ??post adaptive, has nephrosclerosis. Likely contributing to proteinuria. But Ordered serologies last time but patient did not get any labs done. Needs basic serologies including NAYAN, Complements and SPEP and VASYL. This was ordered last time but not done.   Discussed with patient and daughter again today. We agreed to get labs drawn today. Advised low-salt diet and low red meat intake. Patient is requesting refills for antihypertensives and this was done. 3. HTN: on labetalol, norvasc,  and losartan 100 mg daily. BP is stable. 4.  Vitamin D deficiency: on replacement. Check Vit D levels. Orders Placed This Encounter   Procedures    CBC    Basic Metabolic Panel    PTH, Intact    Phosphorus    Vitamin D 25 Hydroxy    Protein / creatinine ratio, urine    Urinalysis    NAYAN Screen with Reflex    Anti-Neutrophilic Cytoplasmic Antibody    C3 Complement    C4 Complement    Electrophoresis Protein, Serum    Immunofixation electrophoresis    Kappa/Lambda Quantitative Free Light Chains, Serum     Return in about 4 months (around 5/4/2023).       Jinny Villareal MD  [facility]

## 2023-01-06 LAB — PTH INTACT: 97

## 2023-01-13 ENCOUNTER — TELEPHONE (OUTPATIENT)
Dept: NEPHROLOGY | Age: 63
End: 2023-01-13

## 2023-01-13 NOTE — TELEPHONE ENCOUNTER
----- Message from Stephanie Yen MD sent at 1/12/2023  7:17 PM EST -----  I did not order levetiracetam level (Keppra). This is for patients who are taking anti-seizure medication Keppra. I don't even she where she is taking this medication. Pls check with lab and forward to provider whoever ordered this pls.  thanks

## 2023-01-13 NOTE — RESULT ENCOUNTER NOTE
I did not order levetiracetam level (Keppra). This is for patients who are taking anti-seizure medication Keppra. I don't even she where she is taking this medication. Pls check with lab and forward to provider whoever ordered this pls.  thanks

## 2023-01-13 NOTE — TELEPHONE ENCOUNTER
Spoke to [facility] AND [facility], they will check into it and make sure the right doctor gets it and double check to make sure they did not do the wrong one.

## 2023-05-03 ENCOUNTER — OFFICE VISIT (OUTPATIENT)
Dept: NEPHROLOGY | Age: 63
End: 2023-05-03
Payer: COMMERCIAL

## 2023-05-03 VITALS
WEIGHT: 156 LBS | OXYGEN SATURATION: 94 % | HEART RATE: 80 BPM | SYSTOLIC BLOOD PRESSURE: 138 MMHG | DIASTOLIC BLOOD PRESSURE: 84 MMHG

## 2023-05-03 DIAGNOSIS — I12.9 HYPERTENSIVE RENAL DISEASE, STAGE 1 THROUGH STAGE 4 OR UNSPECIFIED CHRONIC KIDNEY DISEASE: ICD-10-CM

## 2023-05-03 DIAGNOSIS — N18.4 CKD (CHRONIC KIDNEY DISEASE), STAGE IV (HCC): Primary | ICD-10-CM

## 2023-05-03 PROCEDURE — G8421 BMI NOT CALCULATED: HCPCS | Performed by: INTERNAL MEDICINE

## 2023-05-03 PROCEDURE — 99213 OFFICE O/P EST LOW 20 MIN: CPT | Performed by: INTERNAL MEDICINE

## 2023-05-03 PROCEDURE — 3075F SYST BP GE 130 - 139MM HG: CPT | Performed by: INTERNAL MEDICINE

## 2023-05-03 PROCEDURE — 3079F DIAST BP 80-89 MM HG: CPT | Performed by: INTERNAL MEDICINE

## 2023-05-03 PROCEDURE — G8427 DOCREV CUR MEDS BY ELIG CLIN: HCPCS | Performed by: INTERNAL MEDICINE

## 2023-05-03 PROCEDURE — 3017F COLORECTAL CA SCREEN DOC REV: CPT | Performed by: INTERNAL MEDICINE

## 2023-05-03 PROCEDURE — 1036F TOBACCO NON-USER: CPT | Performed by: INTERNAL MEDICINE

## 2023-05-03 NOTE — PROGRESS NOTES
[address] [place] [address]  Dept: [phone]  Loc: [phone]  Office Progress Note  5/3/2023 10:23 AM      Pt Name:    Zulma Lopes  YOB: 1960  Primary Care Physician:  Jonas Nayak     Chief Complaint:   Chief Complaint   Patient presents with    Follow-up     4 months for CKD IV and HTN        Background Information/Interval History:   The patient is a 59 yo female from [place] with no sig [address] who is here for follow-up evaluation of elevated creatinine. Did not take medicines for HTN for long time. She moved from [place] in 2018. She used to work as  in [place]. Family previously reported that patient was not taking her anti-hypertensive medications for many years while she was still residing at [facility]. She also took some motrin in the past.    Here for 4 months follow-up. Reports BP is better controlled. Here with her daughter. No urinary complaints. No chest pain or shortness of breath     Past History:  Past Medical History:   Diagnosis Date    Hypertension      Past Surgical History:   Procedure Laterality Date     SECTION          VITALS:  /84 (Site: Left Upper Arm, Position: Sitting, Cuff Size: Medium Adult)   Pulse 80   Wt 156 lb (70.8 kg)   SpO2 94%   Wt Readings from Last 3 Encounters:   23 156 lb (70.8 kg)   23 157 lb (71.2 kg)   22 155 lb (70.3 kg)     There is no height or weight on file to calculate BMI.      General Appearance: alert and cooperative with exam, appears comfortable, no distress  Oral: moist oral mucus membranes  Neck: No jugular venous distention  Lungs: Air entry B/L, no crackles or rales, no use of accessory muscles  Heart: S1, S2 heard  GI: soft, non-tender, no guarding  Extremities: No sig LE edema     Medications:  Current Outpatient Medications   Medication Sig Dispense Refill

## 2023-09-07 NOTE — TELEPHONE ENCOUNTER
Patient is [place] for the [place] and will be running out of her meds. She is wanting to know if they can be sent to the [address] [place] [address] out there?  Please advise    Michelle Paris [address] [place] [address] ()[address] -  [phone]

## 2023-09-08 RX ORDER — LOSARTAN POTASSIUM 100 MG/1
100 TABLET ORAL DAILY
Qty: 30 TABLET | Refills: 1 | Status: SHIPPED | OUTPATIENT
Start: 2023-09-08

## 2023-09-08 RX ORDER — ERGOCALCIFEROL 1.25 MG/1
50000 CAPSULE ORAL WEEKLY
Qty: 4 CAPSULE | Refills: 0 | Status: SHIPPED | OUTPATIENT
Start: 2023-09-08

## 2023-09-08 RX ORDER — LABETALOL 200 MG/1
200 TABLET, FILM COATED ORAL 2 TIMES DAILY
Qty: 60 TABLET | Refills: 1 | Status: SHIPPED | OUTPATIENT
Start: 2023-09-08

## 2023-09-08 RX ORDER — AMLODIPINE BESYLATE 10 MG/1
10 TABLET ORAL DAILY
Qty: 30 TABLET | Refills: 1 | Status: SHIPPED | OUTPATIENT
Start: 2023-09-08